# Patient Record
Sex: FEMALE | Race: WHITE
[De-identification: names, ages, dates, MRNs, and addresses within clinical notes are randomized per-mention and may not be internally consistent; named-entity substitution may affect disease eponyms.]

---

## 2018-02-22 NOTE — HP
HISTORY AND PHYSICAL:

 

DATE OF ADMISSION/SURGERY:  02/27/18

 

DATE OF HISTORY AND PHYSICAL:  02/22/18

 

SURGEON:  Dr. Ana Paula Munoz.* (DICTATED BY MACIEJ TIM)

 

PROCEDURE:  Right thumb and ring trigger finger release and right wrist 
cortisone injection.

 

CHIEF COMPLAINT:  Right thumb and ring finger catching and right wrist pain.

 

HISTORY OF PRESENT ILLNESS:   is a 62-year-old female.  She is here for 
followup evaluation of her right hand.  She has now been having triggering of 
her thumb and ring fingers on the right hand, just complained of right wrist 
pain.  She recalls a remote injury of her right wrist, but more recently she 
has had pain on the ulnar aspect, which has started to become more bothersome.  
She has had a long finger trigger release in the past and wants to proceed 
straight to the surgery for the thumb and ring fingers, rather than to try 
injections.

 

PAST MEDICAL HISTORY:  Significant for type 2 diabetes, GERD, mild depression, 
hypertension, and obstructive sleep apnea.

 

PAST SURGICAL HISTORY:  Left total knee arthroplasty, hysterectomy, 
cholecystectomy, tubal ligation, and right long trigger finger release.

 

MEDICATIONS:

1.  Losartan 100 mg.

2.  Trazodone 150 mg at bedtime.

3.  Lexapro 20 mg daily.

4.  Crestor 10 mg every day.

5.  Metformin 1000 mg twice a day.

6.  Clonazepam 0.25 mg twice daily.

7.  Hydrochlorothiazide 25 mg every day.

8.  Farxiga 5 mg every day.

9.  Pantoprazole 40 mg every day.

10.  Ventolin as needed.

11.  Bydureon once a week.

12.  Humalog as needed.

13.  Baby aspirin.

14.  Culturelle.

 

ALLERGIES:  CODEINE, TETANUS VACCINE, and SULFA MEDICATIONS.

 

FAMILY MEDICAL HISTORY:  She denies any pertinent family history.

 

SOCIAL HISTORY:  She denies tobacco use.  She does drink alcohol infrequently 
and denies any illicit drug use.

 

REVIEW OF SYSTEMS:  Positive for her presenting complaint as well as for 
history of anxiety.  She denies any history of problems with anesthesia or 
blood clots including DVT or PE.  Otherwise, review of systems including 
integumentary, HEENT, cardiothoracic, pulmonary, GI, , musculoskeletal, neuro
, endocrinologic, and hematologic systems were negative.  She also denies a 
history of infections with MRSA, hepatitis C, and HIV.

 

                               PHYSICAL EXAMINATION

 

GENERAL:  She is a well-nourished, well-developed, 62-year-old female, in no 
acute distress.  Alert and oriented x3, with no gross neurologic deficiencies. 
Ambulating without a limp.

 

VITAL SIGNS:  Height 61 inches, pulse 61, blood pressure 120/80, respirations 18
, temperature 95.9.  Pain level 0.

 

HEENT:  Normocephalic, atraumatic.  Pupils equally round and reactive to light 
and accommodation.  Extraocular movements intact.

 

NECK:  Supple.  No palpable cervical lymph nodes.  Thyroid is smooth and 
nontender.

 

PULMONARY:  Lungs are clear to auscultation bilaterally, with no wheezes, rales
, or rhonchi.

 

CARDIAC:  Regular rate and rhythm, with no murmurs, rubs, or gallops.  No pedal 
edema.  She has 2+ bilateral radial pulses.

 

MUSCULOSKELETAL:  She has tenderness at the A1 pulley of the thumb and ring 
finger on the right hand.  She also has some tenderness at the distal radial 
ulnar joint and pain with supination and pronation.  She can make a fist, but 
her fingers catch in flexion when she does.  She does have good extension.  
Skin is intact. Neurovascular function is intact distally.

 

 DIAGNOSTIC STUDIES:  X-rays of her right wrist were taken today, AP, lateral, 
and oblique, which showed distal radial ulnar joint arthritis.  Otherwise, good 
joint alignment and no fracture.

 

IMPRESSION:  Right distal radial ulnar joint arthritis, right trigger finger of 
thumb and ring finger.

 

PLAN:   is scheduled to undergo right trigger finger release of the thumb 
and ring finger, and cortisone injection of the right distal radial ulnar joint 
with Dr. Munoz on 02/27/18.  She will return to the clinic in 7 to 10 days 
after surgery for followup and suture removal.  Prescription for tramadol will 
be prescribed for pain control and I-STOP will be checked prior to sending the 
script.  All questions were answered.

 

 ____________________________________ MACIEJ TIM

 

[id number]/[facility] #: [id number]

[email or web address]

## 2018-02-27 ENCOUNTER — HOSPITAL ENCOUNTER (OUTPATIENT)
Dept: HOSPITAL 25 - OREAST | Age: 63
Discharge: HOME | End: 2018-02-27
Attending: ORTHOPAEDIC SURGERY
Payer: COMMERCIAL

## 2018-02-27 VITALS — DIASTOLIC BLOOD PRESSURE: 64 MMHG | SYSTOLIC BLOOD PRESSURE: 140 MMHG

## 2018-02-27 DIAGNOSIS — Z79.84: ICD-10-CM

## 2018-02-27 DIAGNOSIS — G47.33: ICD-10-CM

## 2018-02-27 DIAGNOSIS — K21.9: ICD-10-CM

## 2018-02-27 DIAGNOSIS — J45.909: ICD-10-CM

## 2018-02-27 DIAGNOSIS — Z79.4: ICD-10-CM

## 2018-02-27 DIAGNOSIS — I10: ICD-10-CM

## 2018-02-27 DIAGNOSIS — M19.031: ICD-10-CM

## 2018-02-27 DIAGNOSIS — M65.341: ICD-10-CM

## 2018-02-27 DIAGNOSIS — M65.311: Primary | ICD-10-CM

## 2018-02-27 DIAGNOSIS — F32.9: ICD-10-CM

## 2018-02-27 DIAGNOSIS — E11.9: ICD-10-CM

## 2018-02-28 NOTE — OP
DATE OF OPERATION:  02/27/18 - [facility]

 

DATE OF BIRTH:  [birth year]

 

SURGEON:  Ana Paula Munoz MD

 

ASSISTANT:  MACIEJ Holman.

 

ANESTHESIA:  Local MAC.

 

PRE-OP DIAGNOSIS:  Trigger thumb and ring finger trigger on the right and 
distal radioulnar joint arthritis.

 

POST-OP DIAGNOSIS:  Trigger thumb and ring finger trigger on the right and 
distal radioulnar joint arthritis.

 

OPERATIVE PROCEDURE:  Right distal radioulnar joint injection with cortisone 
and trigger release of the right thumb and right ring finger.

 

ESTIMATED BLOOD LOSS:  Zero.

 

TOURNIQUET TIME:  About 15 minutes.

 

INDICATIONS FOR PROCEDURE:   is a 62-year-old female who has triggering 
and locking of her right thumb and ring finger.  She has previously done well 
with a middle finger trigger release.  She presents for trigger release of the 
right thumb and ring finger.  Additionally her x-ray shows distal radial ulnar 
joint arthritis and she has elected for an intraoperative cortisone injection 
for that.

 

DESCRIPTION OF PROCEDURE:  The patient was brought to the operating room and 
was given a sedation anesthetic and a local infiltration of total of 10 cc of 1
% plain lidocaine in the palm of her right hand overlying the A1 pulley of the 
ring finger and the thumb.  She also was given injection of 80 mg of Depo-
Medrol and 2 cc of 1% plain lidocaine in the right distal radioulnar joint.  
The skin of her right hand and forearm was prepped and draped in the usual 
sterile fashion.  The hand and forearm were exsanguinated and the tourniquet 
elevated to 250 mmHg.  A transverse incision was made centered over the A1 
pulley of the right thumb.  We dissected bluntly through the subcutaneous 
tissue and the digital neurovascular bundles were located and then retracted by 
the surgical assistant, Katelyn Rush.  The A1 pulley was incised 
longitudinally, completely releasing the FPL tendon which had a mild amount of 
abrasion but no tenosynovitis.  The wound was irrigated and the skin edges re-
approximated with 4-0 nylon suture.  Next a transverse incision was made 
centered over the A1 pulley of the right ring finger and dissected bluntly 
through the subcutaneous tissue and the digital neurovascular bundles were 
retracted, again by the surgical assistant, Katelyn Rush.  The A1 pulley was 
incised longitudinally completely releasing the flexor tendons which were in 
good condition.  The wound was irrigated and the skin edges re-approximated 
with 4-0 nylon suture.  The wound was dressed with Xeroform, 4x4, Webril and an 
Ace wrap. The patient tolerated the procedure well and was brought to the 
recovery room in good condition.

 

 [id number]/[facility] #: [id number]

MTDD

## 2018-09-10 ENCOUNTER — HOSPITAL ENCOUNTER (EMERGENCY)
Dept: HOSPITAL 25 - ED | Age: 63
LOS: 1 days | Discharge: HOME | End: 2018-09-11
Payer: MEDICARE

## 2018-09-10 DIAGNOSIS — Z87.891: ICD-10-CM

## 2018-09-10 DIAGNOSIS — Z90.710: ICD-10-CM

## 2018-09-10 DIAGNOSIS — K57.30: ICD-10-CM

## 2018-09-10 DIAGNOSIS — Z88.7: ICD-10-CM

## 2018-09-10 DIAGNOSIS — E11.9: ICD-10-CM

## 2018-09-10 DIAGNOSIS — Z88.5: ICD-10-CM

## 2018-09-10 DIAGNOSIS — N39.0: Primary | ICD-10-CM

## 2018-09-10 DIAGNOSIS — Z88.2: ICD-10-CM

## 2018-09-10 DIAGNOSIS — Z90.49: ICD-10-CM

## 2018-09-10 LAB
BASOPHILS # BLD AUTO: 0.1 10^3/UL (ref 0–0.2)
EOSINOPHIL # BLD AUTO: 0.1 10^3/UL (ref 0–0.6)
HCT VFR BLD AUTO: 41 % (ref 35–47)
HGB BLD-MCNC: 13.2 G/DL (ref 12–16)
LYMPHOCYTES # BLD AUTO: 3.4 10^3/UL (ref 1–4.8)
MCH RBC QN AUTO: 27 PG (ref 27–31)
MCHC RBC AUTO-ENTMCNC: 33 G/DL (ref 31–36)
MCV RBC AUTO: 84 FL (ref 80–97)
MONOCYTES # BLD AUTO: 0.9 10^3/UL (ref 0–0.8)
NEUTROPHILS # BLD AUTO: 16.7 10^3/UL (ref 1.5–7.7)
NRBC # BLD AUTO: 0 10^3/UL
NRBC BLD QL AUTO: 0
PLATELET # BLD AUTO: 315 10^3/UL (ref 150–450)
RBC # BLD AUTO: 4.8 10^6/UL (ref 4–5.4)
RBC UR QL AUTO: (no result)
WBC # BLD AUTO: 21.3 10^3/UL (ref 3.5–10.8)
WBC UR QL AUTO: (no result)

## 2018-09-10 PROCEDURE — 96361 HYDRATE IV INFUSION ADD-ON: CPT

## 2018-09-10 PROCEDURE — 81003 URINALYSIS AUTO W/O SCOPE: CPT

## 2018-09-10 PROCEDURE — 36415 COLL VENOUS BLD VENIPUNCTURE: CPT

## 2018-09-10 PROCEDURE — 74176 CT ABD & PELVIS W/O CONTRAST: CPT

## 2018-09-10 PROCEDURE — 87086 URINE CULTURE/COLONY COUNT: CPT

## 2018-09-10 PROCEDURE — 99284 EMERGENCY DEPT VISIT MOD MDM: CPT

## 2018-09-10 PROCEDURE — 96375 TX/PRO/DX INJ NEW DRUG ADDON: CPT

## 2018-09-10 PROCEDURE — 96367 TX/PROPH/DG ADDL SEQ IV INF: CPT

## 2018-09-10 PROCEDURE — 96365 THER/PROPH/DIAG IV INF INIT: CPT

## 2018-09-10 PROCEDURE — 83605 ASSAY OF LACTIC ACID: CPT

## 2018-09-10 PROCEDURE — 85025 COMPLETE CBC W/AUTO DIFF WBC: CPT

## 2018-09-10 PROCEDURE — 81015 MICROSCOPIC EXAM OF URINE: CPT

## 2018-09-10 PROCEDURE — 87040 BLOOD CULTURE FOR BACTERIA: CPT

## 2018-09-10 PROCEDURE — 86140 C-REACTIVE PROTEIN: CPT

## 2018-09-10 PROCEDURE — 80053 COMPREHEN METABOLIC PANEL: CPT

## 2018-09-10 PROCEDURE — 83690 ASSAY OF LIPASE: CPT

## 2018-09-10 NOTE — ED
Abdominal Pain/Female





- HPI Summary


HPI Summary: 


A 64 y/o female presents to ED c/o RUQ abdominal pain radiating to her right 

back reaching 5/10 in severity. As per triage, "Pt stated that last night she 

started to have stomach pain and today it got worse.  Pt stated that she has 

had a possible pancreatis before.  Pt very nausea.  Pt states that she vomited 

once this morning.  FS is 88- pt is DM2". According to the patient, she has 

been experiencing RUQ abdominal pain radiating to her right back side since 

yesterday. She also vomited this AM. She came to the ED today because it has 

progressively became worse. Denies shoulder pain but has fever. PMHx of tubal 

ligation, hysterectomy, cholecystectomy, denies appendectomy.











- History of Current Complaint


Chief Complaint: EDAbdPain


Stated Complaint: ABD AND BACK PAIN


Time Seen by Provider: 09/10/18 22:18


Hx Obtained From: Patient


Onset/Duration: Sudden Onset, Lasting Days, Still Present, Worse Since


Timing: Constant


Severity Initially: Moderate


Severity Currently: Moderate


Pain Intensity: 6


Pain Scale Used: 0-10 Numeric


Location: Discrete At: RUQ


Radiates: Yes


Radiates to: Back - Right


Aggravating Factor(s): Nothing


Alleviating Factor(s): Nothing


Associated Signs and Symptoms: Positive: Fever, Back Pain, Vomiting


Allergies/Adverse Reactions: 


 Allergies











Allergy/AdvReac Type Severity Reaction Status Date / Time


 


codeine Allergy Severe Headache Verified 09/10/18 19:01


 


sulfamethoxazole Allergy Severe Hives Verified 09/10/18 19:01





[From Bactrim]     


 


trimethoprim [From Bactrim] Allergy Severe Hives Verified 09/10/18 19:01


 


Tetanus Vaccine Allergy Severe Hives Uncoded 09/10/18 19:01


 


ENVIRONMENT/SEASONAL HAYFEVER Allergy Intermediate RUNNY Uncoded 09/10/18 19:01





   NOSE,  





   ITCHY  





   WATERY EYES  














PMH/Surg Hx/FS Hx/Imm Hx


Endocrine/Hematology History: Reports: Hx Diabetes - DM II


   Denies: Hx Systemic Lupus Erythematosus


Cardiovascular History: Reports: Hx Hypertension, Other Cardiovascular Problems/

Disorders - DYSLIPIDEMIA


   Denies: Hx Congestive Heart Failure


Respiratory History: Reports: Hx Asthma, Hx Sleep Apnea - USES CPAP, Other 

Respiratory Problems/Disorders - WHEEZING WITH ASTHMA FLARE-UPS


GI History: Reports: Hx Gall Bladder Disease - gall bladder removal, Hx 

Gastroesophageal Reflux Disease, Hx Irritable Bowel, Other GI Disorders - gerd


 History: 


   Denies: Hx Dialysis, Hx Renal Disease


Musculoskeletal History: Reports: Hx Arthritis - OSTEOARTHRITIS, BILATERAL HANDS

, ANKLES, LEFT KNEE, Other Musculoskeletal History - RIGHT TRIGGER THUMB AND 

RING FINGERS


   Comment Only: Hx Rheumatoid Arthritis - PT UNSURE, Hx Osteoporosis - PT 

UNSURE


Sensory History: Reports: Hx Contacts or Glasses - GLASSES


   Denies: Hx Cataracts, Hx Glaucoma, Hx Hearing Aid


Opthamlomology History: Reports: Hx Contacts or Glasses - GLASSES


   Denies: Hx Cataracts, Hx Glaucoma


Neurological History: 


   Denies: Other Neuro Impairments/Disorders


Psychiatric History: Reports: Hx Anxiety, Hx Depression - MILD


   Denies: Hx Substance Abuse





- Cancer History


Cancer Type, Location and Year: Grandmother  of breast cancer


Hx Chemotherapy: No


Hx Radiation Therapy: No





- Surgical History


[place] [facility] [place] [facility] [place] [facility] [place]  [place] [facility] [place].  [place] [facility] [place] [facility] [place]  [place] 

[place] [facility] [place]


Hx Anesthesia Reactions: No


Infectious Disease History: No


Infectious Disease History: 


   Denies: Traveled Outside the [place] in Last 30 Days





- Family History


Known Family History: Positive: Hypertension, Diabetes, Other - MI, CVA





- Social History


Alcohol Use: Occasionally


Alcohol Amount: 1-2 DRINKS/MONTH


Substance Use Type: Reports: None


Smoking Status (MU): Former Smoker


Type: Cigarettes


Amount Used/How Often: 1 1/2 PPD FOR 10 YRS


Length of Time of Smoking/Using Tobacco: 10 YRS


Have You Smoked in the Last Year: No





Review of Systems


Positive: Fever - SUBJECTIVE


Positive: Abdominal Pain, Vomiting, Other


Positive: Other - POSTIVE: Back pain; NEGATIVE: shoulder pain


All Other Systems Reviewed And Are Negative: Yes





Physical Exam





- Summary


Physical Exam Summary: 


VITAL SIGNS: Reviewed.


GENERAL: Patient is a well-developed and nourished female who is lying 

comfortable in the stretcher. Patient is not in any acute respiratory distress.


HEAD AND FACE: No signs of trauma. No ecchymosis, hematomas or skull 

depressions. No sinus tenderness.


EYES: PERRLA, EOMI x 2, No injected conjunctiva, no nystagmus.


EARS: Hearing grossly intact. Ear canals and tympanic membranes are within 

normal limits.


MOUTH: Oropharynx within normal limits.


NECK: Supple, trachea is midline, no adenopathy, no JVD, no carotid bruit, no c-

spine tenderness, neck with full ROM.


CHEST: Symmetric, no tenderness at palpation


LUNGS: Clear to auscultation bilaterally. No wheezing or crackles.


CVS: Regular rate and rhythm, S1 and S2 present, no murmurs or gallops 

appreciated.


ABDOMEN: Soft, RUQ tenderness. No signs of distention. No rebound no guarding, 

and no masses palpated. Bowel sounds are normal.


BACK: Right CVA tenderness


EXTREMITIES: FROM in all major joints, no edema, no cyanosis or clubbing.


NEURO: Alert and oriented x 3. No acute neurological deficits. Speech is normal 

and follows commands.


SKIN: Dry and warm





Triage Information Reviewed: Yes


Vital Signs On Initial Exam: 


 Initial Vitals











Temp Pulse Resp BP Pulse Ox


 


 97.9 F   82   16   172/73   97 


 


 09/10/18 18:58  09/10/18 18:58  09/10/18 18:58  09/10/18 18:58  09/10/18 18:58











Vital Signs Reviewed: Yes





Diagnostics





- Vital Signs


 Vital Signs











  Temp Pulse Resp BP Pulse Ox


 


 09/10/18 20:56  98.2 F  84  20  188/86  96


 


 09/10/18 18:58  97.9 F  82  16  172/73  97














- Laboratory


Lab Results: 


 Lab Results











  09/10/18 09/10/18 09/10/18 Range/Units





  20:50 20:50 20:50 


 


WBC  21.3 H    (3.5-10.8)  10^3/ul


 


RBC  4.80    (4.00-5.40)  10^6/ul


 


Hgb  13.2    (12.0-16.0)  g/dl


 


Hct  41    (35-47)  %


 


MCV  84    (80-97)  fL


 


MCH  27    (27-31)  pg


 


MCHC  33    (31-36)  g/dl


 


RDW  14    (10.5-15)  %


 


Plt Count  315    (150-450)  10^3/ul


 


MPV  8.2    (7.4-10.4)  um3


 


Neut % (Auto)  78.3    (38-83)  %


 


Lymph % (Auto)  16.1 L    (25-47)  %


 


Mono % (Auto)  4.3    (0-7)  %


 


Eos % (Auto)  0.6    (0-6)  %


 


Baso % (Auto)  0.7    (0-2)  %


 


Absolute Neuts (auto)  16.7 H    (1.5-7.7)  10^3/ul


 


Absolute Lymphs (auto)  3.4    (1.0-4.8)  10^3/ul


 


Absolute Monos (auto)  0.9 H    (0-0.8)  10^3/ul


 


Absolute Eos (auto)  0.1    (0-0.6)  10^3/ul


 


Absolute Basos (auto)  0.1    (0-0.2)  10^3/ul


 


Absolute Nucleated RBC  0    10^3/ul


 


Nucleated RBC %  0    


 


Sodium   137   (135-145)  mmol/L


 


Potassium   4.0   (3.5-5.0)  mmol/L


 


Chloride   101   (101-111)  mmol/L


 


Carbon Dioxide   28   (22-32)  mmol/L


 


Anion Gap   8   (2-11)  mmol/L


 


BUN   12   (6-24)  mg/dL


 


Creatinine   0.69   (0.51-0.95)  mg/dL


 


Est GFR ( Amer)   103.3   (>60)  


 


Est GFR (Non-Af Amer)   85.4   (>60)  


 


BUN/Creatinine Ratio   17.4   (8-20)  


 


Glucose   105 H   ()  mg/dL


 


Lactic Acid    0.6  (0.5-2.0)  mmol/L


 


Calcium   9.6   (8.6-10.3)  mg/dL


 


Total Bilirubin   0.40   (0.2-1.0)  mg/dL


 


AST   13   (13-39)  U/L


 


ALT   14   (7-52)  U/L


 


Alkaline Phosphatase   69   ()  U/L


 


C-Reactive Protein   22.56 H   (<8.01)  mg/L


 


Total Protein   7.8   (6.4-8.9)  g/dL


 


Albumin   4.3   (3.2-5.2)  g/dL


 


Globulin   3.5   (2-4)  g/dL


 


Albumin/Globulin Ratio   1.2   (1-3)  


 


Lipase   69   (11.0-82.0)  U/L


 


Urine Color     


 


Urine Appearance     


 


Urine pH     (5-9)  


 


Ur Specific Gravity     (1.010-1.030)  


 


Urine Protein     (Negative)  


 


Urine Ketones     (Negative)  


 


Urine Blood     (Negative)  


 


Urine Nitrate     (Negative)  


 


Urine Bilirubin     (Negative)  


 


Urine Urobilinogen     (Negative)  


 


Ur Leukocyte Esterase     (Negative)  


 


Urine WBC (Auto)     (Absent)  


 


Urine RBC (Auto)     (Absent)  


 


Ur Squamous Epith Cells     (Absent)  


 


Urine Bacteria     (Absent)  


 


Urine Glucose     (Negative)  














  09/10/18 Range/Units





  21:00 


 


WBC   (3.5-10.8)  10^3/ul


 


RBC   (4.00-5.40)  10^6/ul


 


Hgb   (12.0-16.0)  g/dl


 


Hct   (35-47)  %


 


MCV   (80-97)  fL


 


MCH   (27-31)  pg


 


MCHC   (31-36)  g/dl


 


RDW   (10.5-15)  %


 


Plt Count   (150-450)  10^3/ul


 


MPV   (7.4-10.4)  um3


 


Neut % (Auto)   (38-83)  %


 


Lymph % (Auto)   (25-47)  %


 


Mono % (Auto)   (0-7)  %


 


Eos % (Auto)   (0-6)  %


 


Baso % (Auto)   (0-2)  %


 


Absolute Neuts (auto)   (1.5-7.7)  10^3/ul


 


Absolute Lymphs (auto)   (1.0-4.8)  10^3/ul


 


Absolute Monos (auto)   (0-0.8)  10^3/ul


 


Absolute Eos (auto)   (0-0.6)  10^3/ul


 


Absolute Basos (auto)   (0-0.2)  10^3/ul


 


Absolute Nucleated RBC   10^3/ul


 


Nucleated RBC %   


 


Sodium   (135-145)  mmol/L


 


Potassium   (3.5-5.0)  mmol/L


 


Chloride   (101-111)  mmol/L


 


Carbon Dioxide   (22-32)  mmol/L


 


Anion Gap   (2-11)  mmol/L


 


BUN   (6-24)  mg/dL


 


Creatinine   (0.51-0.95)  mg/dL


 


Est GFR ( Amer)   (>60)  


 


Est GFR (Non-Af Amer)   (>60)  


 


BUN/Creatinine Ratio   (8-20)  


 


Glucose   ()  mg/dL


 


Lactic Acid   (0.5-2.0)  mmol/L


 


Calcium   (8.6-10.3)  mg/dL


 


Total Bilirubin   (0.2-1.0)  mg/dL


 


AST   (13-39)  U/L


 


ALT   (7-52)  U/L


 


Alkaline Phosphatase   ()  U/L


 


C-Reactive Protein   (<8.01)  mg/L


 


Total Protein   (6.4-8.9)  g/dL


 


Albumin   (3.2-5.2)  g/dL


 


Globulin   (2-4)  g/dL


 


Albumin/Globulin Ratio   (1-3)  


 


Lipase   (11.0-82.0)  U/L


 


Urine Color  Yellow  


 


Urine Appearance  Clear  


 


Urine pH  6.0  (5-9)  


 


Ur Specific Gravity  1.021  (1.010-1.030)  


 


Urine Protein  Negative  (Negative)  


 


Urine Ketones  1+ A  (Negative)  


 


Urine Blood  Negative  (Negative)  


 


Urine Nitrate  Negative  (Negative)  


 


Urine Bilirubin  Negative  (Negative)  


 


Urine Urobilinogen  Negative  (Negative)  


 


Ur Leukocyte Esterase  2+ A  (Negative)  


 


Urine WBC (Auto)  2+(11-20/hpf) A  (Absent)  


 


Urine RBC (Auto)  1+(3-5/hpf) A  (Absent)  


 


Ur Squamous Epith Cells  Present A  (Absent)  


 


Urine Bacteria  Absent  (Absent)  


 


Urine Glucose  3+(>=500 mg/dl) A  (Negative)  











Result Diagrams: 


 09/10/18 20:50





 09/10/18 20:50


Lab Statement: Any lab studies that have been ordered have been reviewed, and 

results considered in the medical decision making process.





- CT


  ** CT A/P


CT Interpretation Completed By: Radiologist -   1. No CT findings to correlate 

with patient's symptomatology. Specifically no  obstructing renal or ureteral 

calculi. 2. Distal colonic diverticulosis. ED PHYSICIAN REVIEWED THIS RADIOLOGY 

REPORT.





Re-Evaluation





- Re-Evaluation


  ** First Eval


Re-Evaluation Time: 23:55


Change: Improved


Comment: PATIENT IS FEELING MUCH BETTER





Abdominal Pain Fem Course/Dx





- Course


Course Of Treatment: A 64 y/o female presents to ED c/o RUQ abdominal pain 

radiating to her right back reaching 5/10 in severity. A CT A/P revealed 1. No 

CT findings to correlate with patient's symptomatology. Specifically no.  

obstructing renal or ureteral calculi. 2. Distal colonic diverticulosis. In the 

ED course, the patient received Fentanyl, Levaquin, Vancomycin and Reglan. 

During reevaluaton, the patient revealed she was feeling much better. Patient 

will be discharged with a diagnosis of UTI. Patient will be sent home with 

Levaquin. Patient is to follow up with PCP in 1-2 days. Patient is agreeable 

with this plan.





- Diagnoses


Provider Diagnoses: 


 UTI (urinary tract infection)








Discharge





- Sign-Out/Discharge


Documenting (check all that apply): Patient Departure - DISCHARGE





- Discharge Plan


Condition: Stable


Disposition: HOME


Prescriptions: 


Levofloxacin TAB* [Levaquin TAB*] 500 mg PO DAILY #7 tab


Patient Education Materials:  Urinary Tract Infection in Women (ED)


Referrals: 


Cynthia Wong NP [Primary Care Provider] - 2 Days


Additional Instructions: 


FOLLOW UP WITH PRIMARY CARE IN 1-2 DAYS.





TAKE MEDICATION AS PRESCRIBED.





RETURN TO ED FOR ANY NEW OR WORSENING SYMPTOMS.





- Attestation Statements


Document Initiated by Scribe: Yes


Documenting Scribe: Pierce Bryant


Provider For Whom Scribe is Documenting (Include Credential): Jordan Sweeney MD


Scribe Attestation: 


Pierce LEONARDO, scribed for Jordan Sweeney MD on 09/10/18 at 0034.

## 2018-09-10 NOTE — RAD
EXAM:

  CT Abdomen and Pelvis Without Intravenous Contrast



CLINICAL HISTORY:

  63 years old, female; Pain; Abdominal pain; Flank; Right; Prior surgery; 

Surgery date: 6+ months; Surgery type: Abe[place], tubal ligation, hysterectomy; 

Patient HX: Rt sided flank pain; Additional info: Abd pain



TECHNIQUE:

  Axial computed tomography images of the abdomen and pelvis without 

intravenous contrast.  All CT scans at this facility use at least one of these 

dose optimization techniques: automated exposure control; mA and/or kV 

adjustment per patient size (includes targeted exams where dose is matched to 

clinical indication); or iterative reconstruction.

  Coronal and sagittal reformatted images were created and reviewed.



COMPARISON:

  A/P W CT ABD/PEL W 10/31/2012 6:17 PM



FINDINGS:

  Lung bases:  Normal.  No mass.  No consolidation.



 ABDOMEN:

  Liver:  Normal. Normal size. No masses.

  Gallbladder and bile ducts:  Surgically absent gallbladder.

  Pancreas:  Normal.  No ductal dilation.

  Spleen:  Normal.  No splenomegaly.

  Adrenals:  Normal.  No mass.

  Kidneys and ureters:  No renal solid cortical lesions, calculi, or 

pelvocaliectasis.

  Stomach and bowel:  Incompletely distended grossly normal stomach. Normal 

caliber small bowel.  Distal colonic diverticula without adjacent inflammatory 

changes or associated wall thickening.



 PELVIS:

  Appendix:  Nonvisualized appendix with no secondary findings to suggest 

appendicitis.

  Bladder:  Thin-walled bladder with no focal nodularity, perivesicular 

stranding, or calcifications.  No stones.

  Reproductive:  Uterus and ovaries are surgically absent.



 ABDOMEN and PELVIS:

  Intraperitoneal space:  Normal.  No pneumoperitoneum.  No ascities.

  Bones/joints:  The spine demonstrates mild degenerative changes at multiple 

levels.  Mild bilateral hip primary osteoarthritis.  No fractures. No 

suspicious bone lesions.

  Soft tissues:  Normal. No hernias.

  Vasculature:  The aorta demonstrates mild atherosclerotic calcification.  No 

abdominal aortic aneurysm.

  Lymph nodes:  Normal.  No enlarged lymph nodes.



IMPRESSION:     

  1. No CT findings to correlate with patient's symptomatology. Specifically no 

obstructing renal or ureteral calculi.



  2. Distal colonic diverticulosis.

## 2018-09-11 VITALS — DIASTOLIC BLOOD PRESSURE: 97 MMHG | SYSTOLIC BLOOD PRESSURE: 158 MMHG
